# Patient Record
(demographics unavailable — no encounter records)

---

## 2024-11-06 NOTE — HISTORY OF PRESENT ILLNESS
[FreeTextEntry1] : 72 year old male, former smoker ( 41 pack year hx), ETOH abuse with PMHX of HTN, HLD ( no longer on statin), pAFIB ( xarelto / metoprolol  ) and Prostate Cancer here for f/u. Asymptomatic CV wise, s/p rx,RT for prostate Ca.

## 2024-11-06 NOTE — PHYSICAL EXAM
[Well Developed] : well developed [Well Nourished] : well nourished [Normal Conjunctiva] : normal conjunctiva [Normal Venous Pressure] : normal venous pressure [Normal S1, S2] : normal S1, S2 [No Murmur] : no murmur [Clear Lung Fields] : clear lung fields [Good Air Entry] : good air entry [Soft] : abdomen soft [Normal Gait] : normal gait [No Edema] : no edema [No Rash] : no rash [Moves all extremities] : moves all extremities [Alert and Oriented] : alert and oriented

## 2024-11-06 NOTE — REVIEW OF SYSTEMS
[Dyspnea on exertion] : dyspnea during exertion [Fever] : no fever [Blurry Vision] : no blurred vision [Earache] : no earache [SOB] : no shortness of breath [Chest Discomfort] : no chest discomfort [Palpitations] : no palpitations [Cough] : no cough [Abdominal Pain] : no abdominal pain [Urinary Frequency] : no change in urinary frequency [Joint Pain] : no joint pain [Rash] : no rash [Dizziness] : no dizziness [Confusion] : no confusion was observed [Easy Bleeding] : no tendency for easy bleeding

## 2024-11-06 NOTE — DISCUSSION/SUMMARY
[EKG obtained to assist in diagnosis and management of assessed problem(s)] : EKG obtained to assist in diagnosis and management of assessed problem(s) [FreeTextEntry1] : 72 year old male, former smoker ( 41 pack year hx), ETOH abuse with PMHX of HTN, HLD, AFIB ( xarelto ) and Prostate Cancer TTE: LV EF normal, impaired relaxation CCTA/CTFFR: CAC 1605 - very distal segment of LPDA with physio sign stenosis, otherwise flow >0.8 in the rest of coronary tree. EKG: NSR 61/min  1. PAF - cont metoprolol, Xarelto 2. SAGE - impaired relaxation, stable 3. HLD -cont rosuvastatin 20mg po daily 4. HTN - stop ETOH, BP diary, cont metoprolol/amlodipine 5. CAD -stable -  cont primary prevention as above 6. RTC 6 months

## 2025-02-27 NOTE — HISTORY OF PRESENT ILLNESS
[FreeTextEntry1] : Mr Maribell Dodson is a 74 year old male with Prostate Cancer w/ initial PSA 17ng/ml s/p focal therapy w/ cryotherapy at [place] in 2021, subsequently PSA rising to 13.80ng, MRI w/ two lesions, PET scan and biopsy showing [place] 8 (4+4). He completed 7000Gy in 28 fractions on 5/25/23. and received his ADT therapy and completed.    Urologist: Dr. Jack  PSA Trend: ng/mL  11/02/2022 - 13.30 ng/mL 08/01/2023 - 0.30 ng/mL 2/15/2024:  - 0.12 ng/mL 8/15/2024:     0.07ng/mL 2/25/2025:     0.28ng/mL  Mr. Dodson is here for a follow up appt.  He is experiencing urinary urgency and frequency.  Nocturia X4 and remains on Flomax at qhs.  He continues to experience hot flashes and night sweats..                                                                                                                                                                                                                                                                                                                                                             He continues to experience episodes of leakage/incontinence/dribbling occasionally, weak stream, nocturia 3x nightly, frequency, and urgency. He denies hematuria, dysuria.

## 2025-02-27 NOTE — DISEASE MANAGEMENT
[IIB] : IIB [Clinical] : TNM Stage: c [TTNM] : 1c [NTNM] : 0 [MTNM] : 0 [de-identified] : 9689 [de-identified] : 7000cGy [de-identified] : Prostate/SV

## 2025-02-27 NOTE — HISTORY OF PRESENT ILLNESS
[FreeTextEntry1] : Mr Maribell Dodson is a 74 year old male with Prostate Cancer w/ initial PSA 17ng/ml s/p focal therapy w/ cryotherapy at [place] in 2021, subsequently PSA rising to 13.80ng, MRI w/ two lesions, PET scan and biopsy showing [place] 8 (4+4). He completed 7000Gy in 28 fractions on 5/25/23. and received his ADT therapy and completed.    Urologist: Dr. Jack  PSA Trend: ng/mL  11/02/2022 - 13.30 ng/mL 08/01/2023 - 0.30 ng/mL 2/15/2024:  - 0.12 ng/mL 8/15/2024:     0.07ng/mL 2/25/2025:     0.28ng/mL  Mr. Dodson is here for a follow up appt.  He is experiencing urinary urgency and frequency.  Nocturia X4 and remains on Flomax at qhs.  He continues to experience hot flashes and night sweats..                                                                                                                                                                                                                                                                                                                                                             He continues to experience episodes of leakage/incontinence/dribbling occasionally, weak stream, nocturia 3x nightly, frequency, and urgency. He denies hematuria, dysuria.

## 2025-02-27 NOTE — DISEASE MANAGEMENT
[IIB] : IIB [Clinical] : TNM Stage: c [TTNM] : 1c [NTNM] : 0 [MTNM] : 0 [de-identified] : 3219 [de-identified] : 7000cGy [de-identified] : Prostate/SV

## 2025-02-27 NOTE — REVIEW OF SYSTEMS
[Nocturia] : nocturia [Urinary Frequency] : urinary frequency [IPSS Score (0-40): ___] : IPSS score: [unfilled] [EPIC-CP Score (0-60): ___] : EPIC-CP score: [unfilled] [Negative] : Allergic/Immunologic [Anal Pain: Grade 0] : Anal Pain: Grade 0 [Constipation: Grade 0] : Constipation: Grade 0 [Diarrhea: Grade 0] : Diarrhea: Grade 0 [Dyspepsia: Grade 0] : Dyspepsia: Grade 0 [Dysphagia: Grade 0] : Dysphagia: Grade 0 [Esophagitis: Grade 0] : Esophagitis: Grade 0 [Fecal Incontinence: Grade 0] : Fecal Incontinence: Grade 0 [Gastroparesis: Grade 0] : Gastroparesis: Grade 0 [Nausea: Grade 0] : Nausea: Grade 0 [Proctitis: Grade 0] : Proctitis: Grade 0 [Rectal Pain: Grade 0] : Rectal Pain: Grade 0 [Small Intestinal Obstruction: Grade 0] : Small Intestinal Obstruction: Grade 0 [Vomiting: Grade 0] : Vomiting: Grade 0 [Hematuria: Grade 0] : Hematuria: Grade 0 [Urinary Incontinence: Grade 1 - Occasional (e.g., with coughing, sneezing, etc.), pads not indicated] : Urinary Incontinence: Grade 1 - Occasional (e.g., with coughing, sneezing, etc.), pads not indicated [Urinary Retention: Grade 0] : Urinary Retention: Grade 0 [Urinary Tract Pain: Grade 0] : Urinary Tract Pain: Grade 0 [Urinary Urgency: Grade 1 - Present] : Urinary Urgency: Grade 1 - Present [Urinary Frequency: Grade 1 - Present] : Urinary Frequency: Grade 1 - Present [Erectile Dysfunction: Grade 2 - Decrease in erectile function (frequency/rigidity of erections), erectile intervention indicated, (e.g., medication or mechanical devices such as penile pump)] : Erectile Dysfunction: Grade 2 - Decrease in erectile function (frequency/rigidity of erections), erectile intervention indicated, (e.g., medication or mechanical devices such as penile pump) [Ejaculation Disorder: Grade 2 - Anejaculation or retrograde ejaculation] : Ejaculation Disorder: Grade 2 - Anejaculation or retrograde ejaculation

## 2025-02-27 NOTE — DISEASE MANAGEMENT
[IIB] : IIB [Clinical] : TNM Stage: c [TTNM] : 1c [NTNM] : 0 [MTNM] : 0 [de-identified] : 6290 [de-identified] : 7000cGy [de-identified] : Prostate/SV

## 2025-02-27 NOTE — HISTORY OF PRESENT ILLNESS
[FreeTextEntry1] : Mr Maribell Dodson is a 74 year old male with Prostate Cancer w/ initial PSA 17ng/ml s/p focal therapy w/ cryotherapy at [place] in 2021, subsequently PSA rising to 13.80ng, MRI w/ two lesions, PET scan and biopsy showing [place] 8 (4+4). He completed 7000Gy in 28 fractions on 5/25/23. and received his ADT therapy and completed.    Urologist: Dr. Jack  PSA Trend: ng/mL  11/02/2022 - 13.30 ng/mL 08/01/2023 - 0.30 ng/mL 2/15/2024:  - 0.12 ng/mL 8/15/2024:     0.07ng/mL 2/25/2025:     0.28ng/mL  Mr. Dodson is here for a follow up appt.  He is experiencing urinary urgency and frequency.  Nocturia X4 and remains on Flomax at qhs.  He continues to experience hot flashes and night sweats..                                                                                                                                                                                                                                                                                                                                                             He continues to experience episodes of leakage/incontinence/dribbling occasionally, weak stream, nocturia 3x nightly, frequency, and urgency. He denies hematuria, dysuria.